# Patient Record
Sex: FEMALE | Race: WHITE
[De-identification: names, ages, dates, MRNs, and addresses within clinical notes are randomized per-mention and may not be internally consistent; named-entity substitution may affect disease eponyms.]

---

## 2020-04-29 ENCOUNTER — HOSPITAL ENCOUNTER (INPATIENT)
Dept: HOSPITAL 93 - ER | Age: 85
LOS: 6 days | Discharge: HOME | DRG: 292 | End: 2020-05-05
Attending: INTERNAL MEDICINE | Admitting: INTERNAL MEDICINE
Payer: COMMERCIAL

## 2020-04-29 VITALS — HEIGHT: 61 IN | WEIGHT: 110.23 LBS | BODY MASS INDEX: 20.81 KG/M2

## 2020-04-29 DIAGNOSIS — I11.0: Primary | ICD-10-CM

## 2020-04-29 DIAGNOSIS — I50.810: ICD-10-CM

## 2020-04-29 DIAGNOSIS — J44.9: ICD-10-CM
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
## 2020-04-29 DIAGNOSIS — I27.9: ICD-10-CM

## 2020-04-29 DIAGNOSIS — I16.9: ICD-10-CM
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
## 2020-04-29 DIAGNOSIS — F17.200: ICD-10-CM

## 2020-04-29 DIAGNOSIS — S70.02XA: ICD-10-CM

## 2020-04-29 PROCEDURE — 71275 CT ANGIOGRAPHY CHEST: CPT

## 2020-04-29 PROCEDURE — B246ZZZ ULTRASONOGRAPHY OF RIGHT AND LEFT HEART: ICD-10-PCS | Performed by: INTERNAL MEDICINE

## 2020-04-29 PROCEDURE — CB2YYZZ TOMOGRAPHIC (TOMO) NUCLEAR MEDICINE IMAGING OF RESPIRATORY SYSTEM USING OTHER RADIONUCLIDE: ICD-10-PCS | Performed by: INTERNAL MEDICINE

## 2020-04-30 PROCEDURE — B44HZZZ ULTRASONOGRAPHY OF BILATERAL LOWER EXTREMITY ARTERIES: ICD-10-PCS | Performed by: INTERNAL MEDICINE

## 2022-04-16 ENCOUNTER — HOSPITAL ENCOUNTER (EMERGENCY)
Dept: HOSPITAL 93 - ER | Age: 87
Discharge: HOME | End: 2022-04-16
Payer: COMMERCIAL

## 2022-04-16 VITALS — HEIGHT: 61 IN | BODY MASS INDEX: 19.83 KG/M2 | WEIGHT: 105 LBS

## 2022-04-16 DIAGNOSIS — M77.8: ICD-10-CM

## 2022-04-16 DIAGNOSIS — L03.114: Primary | ICD-10-CM

## 2022-04-25 ENCOUNTER — HOSPITAL ENCOUNTER (INPATIENT)
Dept: HOSPITAL 93 - ER | Age: 87
LOS: 11 days | Discharge: HOME | DRG: 603 | End: 2022-05-06
Attending: INTERNAL MEDICINE | Admitting: INTERNAL MEDICINE
Payer: COMMERCIAL

## 2022-04-25 VITALS — HEIGHT: 63 IN | WEIGHT: 85 LBS | BODY MASS INDEX: 15.06 KG/M2

## 2022-04-25 DIAGNOSIS — B95.1: ICD-10-CM

## 2022-04-25 DIAGNOSIS — I70.203: ICD-10-CM

## 2022-04-25 DIAGNOSIS — B95.61: ICD-10-CM

## 2022-04-25 DIAGNOSIS — I27.21: ICD-10-CM

## 2022-04-25 DIAGNOSIS — B96.89: ICD-10-CM

## 2022-04-25 DIAGNOSIS — I27.20: ICD-10-CM

## 2022-04-25 DIAGNOSIS — L97.518: ICD-10-CM

## 2022-04-25 DIAGNOSIS — Z20.822: ICD-10-CM

## 2022-04-25 DIAGNOSIS — B96.4: ICD-10-CM

## 2022-04-25 DIAGNOSIS — I10: ICD-10-CM

## 2022-04-25 DIAGNOSIS — L03.115: Primary | ICD-10-CM

## 2022-04-25 DIAGNOSIS — I73.89: ICD-10-CM

## 2022-04-25 NOTE — NUR
PACIENTE REFIERE TENGO JIM ULCERA EN LA PIERNA DERECHA HACE MAS DE UN MES.
PACIENTE REFIERE LA DIERON DE MARC DE FIDENCIO DE EMERGENCIAS EN DONDE LE
ADMINISTRARON ANTIBIOTICOS POR VENA. AL VICTORIA DE HOY LA MISMA SIGUE CON CUIDADOS
DE ULCERA EN EL HOGAR CON ENFERMERA. REFIERE QUE LA ENFERMERA LE DIJO
QUE TENIA GUSANOS.

## 2022-04-25 NOTE — NUR
PTE EVALUADO POR DR. GILL. RN TINO ORIENTA A PTE SOBRE TRATAMIENTO A
SEGUIR, EL CUAL REFIERE ENTENDER. EL MISMO COLECTA MUESTRAS Y CANALIZA PTE
UTILIZANDO MEDIDAS ASEPTICAS Y HACE WOUND CARE EN EL AREA AFECTADA.

## 2022-04-26 PROCEDURE — B44HZZZ ULTRASONOGRAPHY OF BILATERAL LOWER EXTREMITY ARTERIES: ICD-10-PCS | Performed by: INTERNAL MEDICINE

## 2022-04-26 PROCEDURE — B246YZZ ULTRASONOGRAPHY OF RIGHT AND LEFT HEART USING OTHER CONTRAST: ICD-10-PCS | Performed by: INTERNAL MEDICINE

## 2022-04-26 NOTE — NUR
SE RECIBE PACIENTE DE TURNO ANTERIOR EN CAMA CON BARANDAS ELEVADAS, PENDIENTE
CONSULTA CON DR. ASHLEY SHERIDAN POR ULCERA INFECTADA EN EL PIE RT.

## 2022-04-26 NOTE — NUR
SE RECIBE PACIENTE DE TURNO ANTERIOR, EN CAMA #06 CON BARANDAS LEEVDAS Y FRENOS
AJUSTADOS POR SEGURIDAD. CON VENOPUNCION PATENTE, HIRO DE ERITEMA Y EDEMA, AL
MOMENTO EN SALINE LOCK. PENDIENTE CONSULTA CON DR. ASHLEY SHERIDAN POR
CELULLITIS. SE MANTIENE PACIENTE EN OBSERVACION POR CAMBIOS.

## 2022-08-08 ENCOUNTER — HOSPITAL ENCOUNTER (EMERGENCY)
Dept: HOSPITAL 93 - ER | Age: 87
Discharge: LEFT BEFORE DISCHARGE | End: 2022-08-08
Payer: COMMERCIAL

## 2022-08-08 VITALS — BODY MASS INDEX: 18.65 KG/M2 | WEIGHT: 95 LBS | HEIGHT: 60 IN

## 2022-08-08 DIAGNOSIS — Z53.21: Primary | ICD-10-CM

## 2022-08-09 ENCOUNTER — HOSPITAL ENCOUNTER (EMERGENCY)
Dept: HOSPITAL 93 - ER | Age: 87
Discharge: HOME | End: 2022-08-09
Payer: COMMERCIAL

## 2022-08-09 VITALS — WEIGHT: 110 LBS | HEIGHT: 60 IN | BODY MASS INDEX: 21.6 KG/M2

## 2022-08-09 DIAGNOSIS — Z88.6: ICD-10-CM

## 2022-08-09 DIAGNOSIS — L97.919: Primary | ICD-10-CM

## 2022-08-09 DIAGNOSIS — I10: ICD-10-CM
